# Patient Record
Sex: FEMALE | Race: WHITE
[De-identification: names, ages, dates, MRNs, and addresses within clinical notes are randomized per-mention and may not be internally consistent; named-entity substitution may affect disease eponyms.]

---

## 2018-08-04 ENCOUNTER — HOSPITAL ENCOUNTER (OUTPATIENT)
Dept: HOSPITAL 75 - LDRP | Age: 33
Setting detail: OBSERVATION
LOS: 1 days | Discharge: HOME | End: 2018-08-05
Attending: OBSTETRICS & GYNECOLOGY | Admitting: OBSTETRICS & GYNECOLOGY
Payer: COMMERCIAL

## 2018-08-04 VITALS — SYSTOLIC BLOOD PRESSURE: 110 MMHG | DIASTOLIC BLOOD PRESSURE: 75 MMHG

## 2018-08-04 VITALS — SYSTOLIC BLOOD PRESSURE: 120 MMHG | DIASTOLIC BLOOD PRESSURE: 72 MMHG

## 2018-08-04 VITALS — SYSTOLIC BLOOD PRESSURE: 112 MMHG | DIASTOLIC BLOOD PRESSURE: 69 MMHG

## 2018-08-04 VITALS — HEIGHT: 64 IN | BODY MASS INDEX: 27.57 KG/M2 | WEIGHT: 161.5 LBS

## 2018-08-04 VITALS — DIASTOLIC BLOOD PRESSURE: 78 MMHG | SYSTOLIC BLOOD PRESSURE: 123 MMHG

## 2018-08-04 VITALS — SYSTOLIC BLOOD PRESSURE: 113 MMHG | DIASTOLIC BLOOD PRESSURE: 74 MMHG

## 2018-08-04 VITALS — SYSTOLIC BLOOD PRESSURE: 108 MMHG | DIASTOLIC BLOOD PRESSURE: 69 MMHG

## 2018-08-04 VITALS — DIASTOLIC BLOOD PRESSURE: 72 MMHG | SYSTOLIC BLOOD PRESSURE: 112 MMHG

## 2018-08-04 VITALS — DIASTOLIC BLOOD PRESSURE: 74 MMHG | SYSTOLIC BLOOD PRESSURE: 108 MMHG

## 2018-08-04 VITALS — DIASTOLIC BLOOD PRESSURE: 81 MMHG | SYSTOLIC BLOOD PRESSURE: 116 MMHG

## 2018-08-04 VITALS — DIASTOLIC BLOOD PRESSURE: 81 MMHG | SYSTOLIC BLOOD PRESSURE: 122 MMHG

## 2018-08-04 DIAGNOSIS — O60.03: Primary | ICD-10-CM

## 2018-08-04 DIAGNOSIS — S80.01XA: ICD-10-CM

## 2018-08-04 DIAGNOSIS — O9A.213: ICD-10-CM

## 2018-08-04 DIAGNOSIS — Z3A.32: ICD-10-CM
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
## 2018-08-04 DIAGNOSIS — Z23: ICD-10-CM

## 2018-08-04 DIAGNOSIS — S50.02XA: ICD-10-CM

## 2018-08-04 LAB
BASOPHILS # BLD AUTO: 0 10^3/UL (ref 0–0.1)
BASOPHILS NFR BLD AUTO: 0 % (ref 0–10)
EOSINOPHIL # BLD AUTO: 0.1 10^3/UL (ref 0–0.3)
EOSINOPHIL NFR BLD AUTO: 1 % (ref 0–10)
ERYTHROCYTE [DISTWIDTH] IN BLOOD BY AUTOMATED COUNT: 13.1 % (ref 10–14.5)
HCT VFR BLD CALC: 39 % (ref 35–52)
HGB BLD-MCNC: 13.5 G/DL (ref 11.5–16)
LYMPHOCYTES # BLD AUTO: 1.8 X 10^3 (ref 1–4)
LYMPHOCYTES NFR BLD AUTO: 22 % (ref 12–44)
MANUAL DIFFERENTIAL PERFORMED BLD QL: NO
MCH RBC QN AUTO: 31 PG (ref 25–34)
MCHC RBC AUTO-ENTMCNC: 35 G/DL (ref 32–36)
MCV RBC AUTO: 88 FL (ref 80–99)
MONOCYTES # BLD AUTO: 0.6 X 10^3 (ref 0–1)
MONOCYTES NFR BLD AUTO: 7 % (ref 0–12)
NEUTROPHILS # BLD AUTO: 6 X 10^3 (ref 1.8–7.8)
NEUTROPHILS NFR BLD AUTO: 71 % (ref 42–75)
PLATELET # BLD: 263 10^3/UL (ref 130–400)
PMV BLD AUTO: 9.3 FL (ref 7.4–10.4)
RBC # BLD AUTO: 4.41 10^6/UL (ref 4.35–5.85)
WBC # BLD AUTO: 8.5 10^3/UL (ref 4.3–11)

## 2018-08-04 PROCEDURE — 36415 COLL VENOUS BLD VENIPUNCTURE: CPT

## 2018-08-04 PROCEDURE — 96361 HYDRATE IV INFUSION ADD-ON: CPT

## 2018-08-04 PROCEDURE — 85025 COMPLETE CBC W/AUTO DIFF WBC: CPT

## 2018-08-04 PROCEDURE — 99211 OFF/OP EST MAY X REQ PHY/QHP: CPT

## 2018-08-04 PROCEDURE — 96360 HYDRATION IV INFUSION INIT: CPT

## 2018-08-04 PROCEDURE — 96372 THER/PROPH/DIAG INJ SC/IM: CPT

## 2018-08-04 PROCEDURE — 90715 TDAP VACCINE 7 YRS/> IM: CPT

## 2018-08-04 RX ADMIN — BETAMETHASONE ACETATE AND BETAMETHASONE SODIUM PHOSPHATE SCH MG: 3; 3 INJECTION, SUSPENSION INTRA-ARTICULAR; INTRALESIONAL; INTRAMUSCULAR; SOFT TISSUE at 16:57

## 2018-08-04 RX ADMIN — SODIUM CHLORIDE, SODIUM LACTATE, POTASSIUM CHLORIDE, CALCIUM CHLORIDE, AND DEXTROSE MONOHYDRATE SCH MLS/HR: 600; 310; 30; 20; 5 INJECTION, SOLUTION INTRAVENOUS at 16:57

## 2018-08-04 NOTE — XMS REPORT
Continuity of Care Document

 Created on: 2018



DOLORES DUNAWAY

External Reference #: [id number]

: 1985

Sex: Female



Demographics







 Address  [address] [place] [address]

 

 Home Phone  [phone] x

 

 Preferred Language  Unknown

 

 Marital Status  Unknown

 

 Yazidi Affiliation  Unknown

 

 Race  Unknown

 

 Ethnic Group  Unknown





Author







 Author  [facility]

 

 Organization  [facility]

 

 Address  Unknown

 

 Phone  Unavailable



              



Allergies

      



There is no data.                  



Medications

      



There is no data.                  



Problems

      





 Date Dx Coded            Attending            Type            Code            
Diagnosis            Diagnosed By        

 

 2015            YOU RICHMOND DO            Ot            G97.1  
                                

 

 2015            YOU RICHMOND DO            Ot            O89.8  
                                



                    



Procedures

      



There is no data.                  



Results

      



There is no data.              



Encounters

      





 ACCT No.            Visit Date/Time            Discharge            Status    
        Pt. Type            Provider            Facility            Loc./Unit  
          Complaint        

 

 [id number]            2015 15:54:00            2015 16:40:00    
        DIS            Outpatient            YOU RICHMOND DO            
[facility]

## 2018-08-04 NOTE — HISTORY & PHYSICAL
History and Physical


Date Seen by Provider:  Aug 4, 2018


Time Seen by Provider:  22:01


This patient is a 33-year-old  A1 white female with an EDC of  giving her an estimated gestational age now of 32 weeks.  She presented 

approximately 1520 today after having experienced a fairly significant fall 

while playing with one of her children 3 p.m.  She has a fairly notable 

contusion to her left elbow and to her knee.  She denies rupture membranes or 

bleeding.  She is feeling occasional contractions.  During the initial period 

of monitoring she began to contract about every 2-4 minutes.  She does feel 

baby moving.  She has had no other complications this pregnancy.





Allergies are none





Medications are Synthroid and prenatal vitamins





Medical social and surgical histories are per the antepartum record





HEENT exam is normal


Neck supple no lymphadenopathy no thyromegaly


Abdomen is gravid soft nontender nondistended


Extremities show clubbing cyanosis.  The left elbow and forearm have a 10 x 14 

cm ecchymotic contusion.  The knee has a smaller abrasion.





Pelvic exam shows a cervix less than a centimeter dilated and thick





Fetal monitor shows contractions 2-4 minutes.  The patient feels about 6 or 8 

of these an hour.





Laboratory Tests


18 17:00








Assessment and plan   32 weeks gestation status post traumatic fall and now 

with  contractions.  Patient will be monitored for at least 24 hours 

because of the increased potential and risk for placental abruption after 

trauma.  She is being observed for signs or symptoms of progress in labor.  She 

does strain cervical change we will initiate tocolytics.  She has been given 12 

mg of betamethasone IM empirically and we will repeat that in 24 hours.


 labor/trauma in pregnancy





Allergies and Home Medications


Allergies


Coded Allergies:  


     No Known Drug Allergies (Unverified , 18)





Patient Home Medication List


Home Medication List Reviewed:  Yes











KENDRA TELLEZ MD Aug 4, 2018 10:06 pm

## 2018-08-05 VITALS — DIASTOLIC BLOOD PRESSURE: 72 MMHG | SYSTOLIC BLOOD PRESSURE: 113 MMHG

## 2018-08-05 VITALS — DIASTOLIC BLOOD PRESSURE: 71 MMHG | SYSTOLIC BLOOD PRESSURE: 111 MMHG

## 2018-08-05 VITALS — SYSTOLIC BLOOD PRESSURE: 95 MMHG | DIASTOLIC BLOOD PRESSURE: 53 MMHG

## 2018-08-05 VITALS — SYSTOLIC BLOOD PRESSURE: 116 MMHG | DIASTOLIC BLOOD PRESSURE: 73 MMHG

## 2018-08-05 VITALS — DIASTOLIC BLOOD PRESSURE: 62 MMHG | SYSTOLIC BLOOD PRESSURE: 114 MMHG

## 2018-08-05 RX ADMIN — SODIUM CHLORIDE, SODIUM LACTATE, POTASSIUM CHLORIDE, CALCIUM CHLORIDE, AND DEXTROSE MONOHYDRATE SCH MLS/HR: 600; 310; 30; 20; 5 INJECTION, SOLUTION INTRAVENOUS at 12:22

## 2018-08-05 RX ADMIN — BETAMETHASONE ACETATE AND BETAMETHASONE SODIUM PHOSPHATE SCH MG: 3; 3 INJECTION, SUSPENSION INTRA-ARTICULAR; INTRALESIONAL; INTRAMUSCULAR; SOFT TISSUE at 15:17

## 2018-08-05 RX ADMIN — SODIUM CHLORIDE, SODIUM LACTATE, POTASSIUM CHLORIDE, CALCIUM CHLORIDE, AND DEXTROSE MONOHYDRATE SCH MLS/HR: 600; 310; 30; 20; 5 INJECTION, SOLUTION INTRAVENOUS at 06:04

## 2018-08-05 RX ADMIN — SODIUM CHLORIDE, SODIUM LACTATE, POTASSIUM CHLORIDE, CALCIUM CHLORIDE, AND DEXTROSE MONOHYDRATE SCH MLS/HR: 600; 310; 30; 20; 5 INJECTION, SOLUTION INTRAVENOUS at 00:00

## 2018-08-05 NOTE — DISCHARGE INSTRUCTIONS
Discharge Instructions


Discharge Medications


New, Converted or Re-Newed RX:  Other





Patient Instructions


Patient Instructions:  


As directed


Return to The Hospital For:  


As directed





Activity & Diet


Discharge Diet:  No Restrictions


Activity as Tolerated:  Yes





Orders-Post D/C & Referrals


Return to clinic as scheduled for your are OB appointment


Return to clinic for any signs symptoms or indications of  labor


Return to clinic for any issues of concern with the pregnancy.











KENDRA TELLEZ MD Aug 5, 2018 7:22 am

## 2018-08-05 NOTE — PROGRESS NOTE-STANDARD
Standard Progress Note


Progress Notes/Assess & Plan


Date Seen by Provider:  Aug 5, 2018


Time Seen by Provider:  07:17


Progress/Assessment & Plan


This patient is without complaint except for her left elbow and right knee 

being tender.  She denies contractions.  She does feel baby moving.  She denies 

rupture membranes or bleeding.  She denies headache, denies shortness of breath

, denies nausea vomiting, and denies chest pain.





Fetal monitor shows no contractions.  There is a normal fetal heart rate 

pattern.








Vital Signs








  Date Time  Temp Pulse Resp B/P (MAP) Pulse Ox O2 Delivery O2 Flow Rate FiO2


 


18 05:30 97.6 83 18 113/72 (86)  Room Air  


 


18 03:30 97.8 81 18 111/71 (84)  Room Air  


 


18 00:30 98.1 76 18 114/62 (79)  Room Air  


 


18 23:30  78 18 112/69 (83)  Room Air  


 


18 22:00  94 18 110/75 (87)  Room Air  


 


18 21:00  90  108/69 (82)  Room Air  


 


18 20:00 99.0 94  120/72 (88)  Room Air  


 


18 19:00  88  112/72 (85)  Room Air  


 


18 18:30  86  113/74 (87)  Room Air  


 


18 18:00  86  108/74 (85)  Room Air  


 


18 17:30 98.1 82  116/81 (93)  Room Air  


 


18 17:00      Room Air  


 


18 16:30  73 16 122/81 (95)  Room Air  


 


18 16:00  87 16 123/78 (93)  Room Air  





Vital signs are stable.  Patient afebrile.





The abdomen is gravid soft nontender nondistended.


Extremities show no clubbing cyanosis.  There is a contusion on her left elbow 

an abrasion and contusion on her right knee that are stable.  There is no 

evidence of bone fracture.  Patient can move her extremities well.  She states 

that she is sore but her pain has improved since admission.





Assessment and plan   hospital day number 2 with  labor that now has 

resolved.  This returned labor commenced after the patient had experienced a 

traumatic fall.  She has been observed since shortly after 3 p.m. yesterday 

when she follows observation will continue until 3 p.m. today specific for any 

signs symptoms and indications of fetal compromise/abruption.  He remains 

stable through the 24-hour period of observation and she will be discharged 

home with follow-up in clinic


Final Diagnosis


 labor/trauma in pregnancy











KENDRA TELLEZ MD Aug 5, 2018 7:19 am

## 2018-09-07 ENCOUNTER — HOSPITAL ENCOUNTER (OUTPATIENT)
Dept: HOSPITAL 75 - PREOP | Age: 33
Discharge: HOME | End: 2018-09-07
Attending: OBSTETRICS & GYNECOLOGY
Payer: COMMERCIAL

## 2018-09-07 VITALS — WEIGHT: 163.5 LBS | BODY MASS INDEX: 27.91 KG/M2 | HEIGHT: 64 IN

## 2018-09-07 VITALS — DIASTOLIC BLOOD PRESSURE: 81 MMHG | SYSTOLIC BLOOD PRESSURE: 115 MMHG

## 2018-09-07 DIAGNOSIS — Z01.818: Primary | ICD-10-CM

## 2018-09-07 PROCEDURE — 87081 CULTURE SCREEN ONLY: CPT

## 2018-09-14 ENCOUNTER — HOSPITAL ENCOUNTER (INPATIENT)
Dept: HOSPITAL 75 - LDRP | Age: 33
LOS: 2 days | Discharge: HOME | End: 2018-09-16
Attending: OBSTETRICS & GYNECOLOGY | Admitting: OBSTETRICS & GYNECOLOGY
Payer: COMMERCIAL

## 2018-09-14 VITALS — SYSTOLIC BLOOD PRESSURE: 124 MMHG | DIASTOLIC BLOOD PRESSURE: 83 MMHG

## 2018-09-14 VITALS — HEIGHT: 64 IN | BODY MASS INDEX: 28 KG/M2 | WEIGHT: 164 LBS

## 2018-09-14 VITALS — SYSTOLIC BLOOD PRESSURE: 135 MMHG | DIASTOLIC BLOOD PRESSURE: 93 MMHG

## 2018-09-14 VITALS — SYSTOLIC BLOOD PRESSURE: 111 MMHG | DIASTOLIC BLOOD PRESSURE: 68 MMHG

## 2018-09-14 VITALS — DIASTOLIC BLOOD PRESSURE: 84 MMHG | SYSTOLIC BLOOD PRESSURE: 127 MMHG

## 2018-09-14 VITALS — DIASTOLIC BLOOD PRESSURE: 90 MMHG | SYSTOLIC BLOOD PRESSURE: 120 MMHG

## 2018-09-14 DIAGNOSIS — O34.211: ICD-10-CM

## 2018-09-14 DIAGNOSIS — O40.3XX0: Primary | ICD-10-CM

## 2018-09-14 DIAGNOSIS — E03.9: ICD-10-CM

## 2018-09-14 DIAGNOSIS — Z3A.38: ICD-10-CM

## 2018-09-14 LAB
APTT PPP: YELLOW S
BACTERIA #/AREA URNS HPF: (no result) /HPF
BASOPHILS # BLD AUTO: 0 10^3/UL (ref 0–0.1)
BASOPHILS NFR BLD AUTO: 0 % (ref 0–10)
BILIRUB UR QL STRIP: NEGATIVE
EOSINOPHIL # BLD AUTO: 0 10^3/UL (ref 0–0.3)
EOSINOPHIL NFR BLD AUTO: 0 % (ref 0–10)
ERYTHROCYTE [DISTWIDTH] IN BLOOD BY AUTOMATED COUNT: 12.7 % (ref 10–14.5)
FIBRINOGEN PPP-MCNC: (no result) MG/DL
GLUCOSE UR STRIP-MCNC: NEGATIVE MG/DL
HCT VFR BLD CALC: 36 % (ref 35–52)
HGB BLD-MCNC: 12.9 G/DL (ref 11.5–16)
KETONES UR QL STRIP: NEGATIVE
LEUKOCYTE ESTERASE UR QL STRIP: NEGATIVE
LYMPHOCYTES # BLD AUTO: 1.5 X 10^3 (ref 1–4)
LYMPHOCYTES NFR BLD AUTO: 22 % (ref 12–44)
MANUAL DIFFERENTIAL PERFORMED BLD QL: NO
MCH RBC QN AUTO: 31 PG (ref 25–34)
MCHC RBC AUTO-ENTMCNC: 36 G/DL (ref 32–36)
MCV RBC AUTO: 86 FL (ref 80–99)
MONOCYTES # BLD AUTO: 0.5 X 10^3 (ref 0–1)
MONOCYTES NFR BLD AUTO: 7 % (ref 0–12)
NEUTROPHILS # BLD AUTO: 5 X 10^3 (ref 1.8–7.8)
NEUTROPHILS NFR BLD AUTO: 71 % (ref 42–75)
NITRITE UR QL STRIP: NEGATIVE
PH UR STRIP: 6 [PH] (ref 5–9)
PLATELET # BLD: 224 10^3/UL (ref 130–400)
PMV BLD AUTO: 10 FL (ref 7.4–10.4)
PROT UR QL STRIP: NEGATIVE
RBC # BLD AUTO: 4.23 10^6/UL (ref 4.35–5.85)
RBC #/AREA URNS HPF: (no result) /HPF
SP GR UR STRIP: 1.02 (ref 1.02–1.02)
SQUAMOUS #/AREA URNS HPF: (no result) /HPF
UROBILINOGEN UR-MCNC: NORMAL MG/DL
WBC # BLD AUTO: 7 10^3/UL (ref 4.3–11)
WBC #/AREA URNS HPF: (no result) /HPF

## 2018-09-14 PROCEDURE — 81000 URINALYSIS NONAUTO W/SCOPE: CPT

## 2018-09-14 PROCEDURE — 85025 COMPLETE CBC W/AUTO DIFF WBC: CPT

## 2018-09-14 PROCEDURE — 36415 COLL VENOUS BLD VENIPUNCTURE: CPT

## 2018-09-14 PROCEDURE — 94664 DEMO&/EVAL PT USE INHALER: CPT

## 2018-09-14 PROCEDURE — 86901 BLOOD TYPING SEROLOGIC RH(D): CPT

## 2018-09-14 PROCEDURE — 86900 BLOOD TYPING SEROLOGIC ABO: CPT

## 2018-09-14 PROCEDURE — 86850 RBC ANTIBODY SCREEN: CPT

## 2018-09-14 PROCEDURE — 88307 TISSUE EXAM BY PATHOLOGIST: CPT

## 2018-09-14 RX ADMIN — OXYCODONE HYDROCHLORIDE AND ACETAMINOPHEN PRN TAB: 10; 325 TABLET ORAL at 23:56

## 2018-09-14 RX ADMIN — SODIUM CHLORIDE, SODIUM LACTATE, POTASSIUM CHLORIDE, AND CALCIUM CHLORIDE PRN MLS/HR: 600; 310; 30; 20 INJECTION, SOLUTION INTRAVENOUS at 11:00

## 2018-09-14 RX ADMIN — OXYCODONE HYDROCHLORIDE AND ACETAMINOPHEN PRN TAB: 10; 325 TABLET ORAL at 16:00

## 2018-09-14 RX ADMIN — OXYCODONE HYDROCHLORIDE AND ACETAMINOPHEN PRN TAB: 10; 325 TABLET ORAL at 18:30

## 2018-09-14 RX ADMIN — KETOROLAC TROMETHAMINE SCH MG: 30 INJECTION, SOLUTION INTRAMUSCULAR; INTRAVENOUS at 19:45

## 2018-09-14 RX ADMIN — KETOROLAC TROMETHAMINE SCH MG: 30 INJECTION, SOLUTION INTRAMUSCULAR; INTRAVENOUS at 13:40

## 2018-09-14 RX ADMIN — DOCUSATE SODIUM SCH MG: 100 CAPSULE ORAL at 21:19

## 2018-09-14 RX ADMIN — OXYCODONE HYDROCHLORIDE AND ACETAMINOPHEN PRN TAB: 10; 325 TABLET ORAL at 22:41

## 2018-09-14 RX ADMIN — SODIUM CHLORIDE, SODIUM LACTATE, POTASSIUM CHLORIDE, AND CALCIUM CHLORIDE PRN MLS/HR: 600; 310; 30; 20 INJECTION, SOLUTION INTRAVENOUS at 10:34

## 2018-09-14 NOTE — XMS REPORT
Continuity of Care Document

 Created on: 2018



DOLORES DUNAWAY

External Reference #: [id number]

: 1985

Sex: Female



Demographics







 Address  [address] [place]  [address]

 

 Home Phone  [phone] x

 

 Preferred Language  Unknown

 

 Marital Status  Unknown

 

 Pentecostalism Affiliation  Unknown

 

 Race  Unknown

 

 Ethnic Group  Unknown





Author







 Author  [facility]

 

 Organization  [facility]

 

 Address  Unknown

 

 Phone  Unavailable



              



Allergies

      





 Active            Description            Code            Type            
Severity            Reaction            Onset            Reported/Identified   
         Relationship to Patient            Clinical Status        

 

 Yes            No Known Drug Allergies            [id number]            Drug 
Allergy            Unknown            N/A                         2018   
                               



                  



Medications

      



There is no data.                  



Problems

      





 Date Dx Coded            Attending            Type            Code            
Diagnosis            Diagnosed By        

 

 2015            YOU RICHMOND DO            Ot            G97.1  
                                

 

 2015            YOU RICHMOND DO            Ot            O89.8  
                                

 

 2018                         Ot            O60.03             
LABOR WITHOUT DELIVERY, THIRD TR                     

 

 2018                         Ot            O9A.213            INJ/POISN/
OTH CONSEQ OF EXTERNAL CAUSES                      

 

 2018                         Ot            S50.02XA            CONTUSION 
OF LEFT ELBOW, INITIAL ENCOUNT                     

 

 2018                         Ot            S80.01XA            CONTUSION 
OF RIGHT KNEE, INITIAL ENCOUNT                     

 

 2018                         Ot            W19.XXXA            
UNSPECIFIED FALL, INITIAL ENCOUNTER                     

 

 2018                         Ot            Z23            ENCOUNTER FOR 
IMMUNIZATION                     

 

 2018                         Ot            Z3A.32            32 WEEKS 
GESTATION OF PREGNANCY                     



                                  



Procedures

      



There is no data.                  



Results

      





 Test            Result            Range        









 Complete blood count (CBC) with automated white blood cell (WBC) differential 
- 18 17:00         









 Blood leukocytes automated count (number/volume)            8.5 10*3/uL       
     4.3-11.0        

 

 Blood erythrocytes automated count (number/volume)            4.41 10*6/uL    
        4.35-5.85        

 

 Venous blood hemoglobin measurement (mass/volume)            13.5 g/dL        
    11.5-16.0        

 

 Blood hematocrit (volume fraction)            39 %            35-52        

 

 Automated erythrocyte mean corpuscular volume            88 [foz_us]          
  80-99        

 

 Automated erythrocyte mean corpuscular hemoglobin (mass per erythrocyte)      
      31 pg            25-34        

 

 Automated erythrocyte mean corpuscular hemoglobin concentration measurement (
mass/volume)            35 g/dL            32-36        

 

 Automated erythrocyte distribution width ratio            13.1 %            
10.0-14.5        

 

 Automated blood platelet count (count/volume)            263 10*3/uL          
  130-400        

 

 Automated blood platelet mean volume measurement            9.3 [foz_us]      
      7.4-10.4        

 

 Automated blood neutrophils/100 leukocytes            71 %            42-75   
     

 

 Automated blood lymphocytes/100 leukocytes            22 %            12-44   
     

 

 Blood monocytes/100 leukocytes            7 %            0-12        

 

 Automated blood eosinophils/100 leukocytes            1 %            0-10     
   

 

 Automated blood basophils/100 leukocytes            0 %            0-10        

 

 Blood neutrophils automated count (number/volume)            6.0 10*3         
   1.8-7.8        

 

 Blood lymphocytes automated count (number/volume)            1.8 10*3         
   1.0-4.0        

 

 Blood monocytes automated count (number/volume)            0.6 10*3            
0.0-1.0        

 

 Automated eosinophil count            0.1 10*3/uL            0.0-0.3        

 

 Automated blood basophil count (count/volume)            0.0 10*3/uL          
  0.0-0.1        









 [id number] - 18 17:00         









 [id number]            SPECIMEN AVAILABLE             NRG        









 Methicillin resistant Staphylococcus aureus (MRSA) screening culture -  09:33         









 Methicillin resistant Staphylococcus aureus (MRSA) screening culture          
  NEG             NRG        



                    



Encounters

      





 ACCT No.            Visit Date/Time            Discharge            Status    
        Pt. Type            Provider            Facility            Loc./Unit  
          Complaint        

 

 [id number]            2015 15:54:00            2015 16:40:00    
        DIS            Outpatient            YOU RICHMOND DO            
[facility]                     

 

 [id number]            2018 10:12:00                         ACT       
     Inpatient            KENDRA TELLEZ MD            [facility]            LD            PREVIOUS        

 

 [id number]            2018 14:28:00                                   
   Document Registration                                                       
     

 

 [id number]            2018 07:21:00                                   
   Document Registration

## 2018-09-14 NOTE — HISTORY & PHYSICAL
History and Physical


Date Seen by Provider:  Sep 14, 2018


Time Seen by Provider:  12:04


This patient is a 33-year-old  A1  1 white female with an EDC of 

2018 presenting now for repeat  delivery at 38 weeks 

gestation.  She has a history significant for polyhydramnios.  She is also 

hypothyroid on medication.  She denies rupture membranes or bleeding she is 

having contractions about every 4-5 minutes.  She had a GBS culture at 35 weeks 

gestation that was negative.





Allergies are none





Medications are Synthroid 200 g daily and prenatal vitamins





Medical social and surgical histories are per the antepartum record





HEENT exam is normal


Neck supple no lymphadenopathy no thyromegaly


Abdomen is gravid soft nontender nondistended


Extreme show no clubbing cyanosis.  There is no Homans sign.








Pelvic exam is deferred





Laboratory Tests


18 10:34








Fetal monitor shows contractions every 4-5 minutes and a normal fetal heart 

rate pattern





Assessment and plan   term pregnancy at 38 weeks gestation admitted now for 

repeat  delivery.  Surgical risks and complications recovery and follow-

up have been fully discussed.  Patient accepts those risks and is ready to


38 weeks gestation Previous  he has





Allergies and Home Medications


Allergies


Coded Allergies:  


     No Known Drug Allergies (Unverified , 18)





Home Medications


Levothyroxine Sodium 200 Mcg Tablet, 200 MCG PO DAILY, (Reported)


Prenatal Vit/Iron Fumarate/FA 1 Each Tablet, 1 EACH PO DAILY, (Reported)





Patient Home Medication List


Home Medication List Reviewed:  Yes











KENDRA TELLEZ MD Sep 14, 2018 12:07 pm

## 2018-09-14 NOTE — OPERATIVE REPORT
DATE OF SERVICE:  2018



PREOPERATIVE DIAGNOSIS:

Term pregnancy at 38 weeks gestation with polyhydramnios for repeat .



POSTOPERATIVE DIAGNOSIS:

Term pregnancy at 38 weeks gestation with polyhydramnios for repeat .



OPERATIVE PROCEDURE:

Repeat low transverse  delivery of a viable female infant with Apgars of

8 and 9 at 1 and 5 minutes.  Expected weight of 7 pounds 1 ounce, birth time of

12:24 and a cord blood pH was 7.31.



OPERATIVE DESCRIPTION:

With the patient in the supine position under satisfactory spinal analgesia, the

patient was prepped and draped in the usual fashion for abdominal surgery. 

Clark catheter was placed in the urinary bladder.



A repeat Pfannenstiel incision made through skin with a scalpel.  The patient's

abdomen entered in the usual manner.  Bladder retractor placed in position,

clean scalpel used to make a 4 cm hysterotomy incision transversely across the

lower uterine segment that was extended by blunt dissection as well.  Copious

clear fluid was released on hysterotomy.  Ruiz forceps were applied to

facilitate the delivery of a vigorous viable female infant with Apgars and stats

were noted above.



The infant was bulb suctioned on delivery of the head and nuchal cord was easily

released and delivery completed.  The infant was bulb suctioned on delivery of

the head and then again on completion of delivery.  The umbilical cord was

doubly clamped and cut and the infant passed to the pediatric nurse in

attendance for delivery.



Cord bloods were obtained.  The placenta delivered spontaneously and partially

manually as it was quite adherent near the fundus.  The placenta was sent to

pathology for permanent section.  It was a normal placenta with a 3-vessel cord.

 The uterus was exteriorized and to wipe clean with a wet laparotomy sponge. 

Uterine incision closed with running locked suture of 2-0 Vicryl.  Hemostasis

was complete.  The uterus was returned to the abdominal cavity.  All blood clot

and debris removed from the abdominal cavity.  Sponge and needle counts correct,

hemostasis assured.  The anterior parietal peritoneum was closed in a running

suture of 2-0 Vicryl.  Rectus muscles were closed with that suture.  Rectus

fascia was closed with 2-0 Vicryl, subcutaneous tissue with 2-0 Vicryl and the

skin was stapled.



Sponge and needle counts were correct at the end of procedure.  Estimated blood

loss for procedure was around 500 mL.  The patient tolerated the procedure well

and was transferred to the recovery room in stable condition.  The infant had

been taken stable to the full term nursery under the care of the pediatric

nurse.





Job ID: [id number]

DocumentID: [id number]

Dictated Date:  2018 12:49:54

Transcription Date: 2018 18:13:39

Dictated By: KENDRA TELLEZ MD

## 2018-09-15 VITALS — SYSTOLIC BLOOD PRESSURE: 123 MMHG | DIASTOLIC BLOOD PRESSURE: 83 MMHG

## 2018-09-15 VITALS — DIASTOLIC BLOOD PRESSURE: 80 MMHG | SYSTOLIC BLOOD PRESSURE: 106 MMHG

## 2018-09-15 VITALS — SYSTOLIC BLOOD PRESSURE: 122 MMHG | DIASTOLIC BLOOD PRESSURE: 83 MMHG

## 2018-09-15 VITALS — SYSTOLIC BLOOD PRESSURE: 116 MMHG | DIASTOLIC BLOOD PRESSURE: 80 MMHG

## 2018-09-15 VITALS — SYSTOLIC BLOOD PRESSURE: 124 MMHG | DIASTOLIC BLOOD PRESSURE: 89 MMHG

## 2018-09-15 VITALS — DIASTOLIC BLOOD PRESSURE: 72 MMHG | SYSTOLIC BLOOD PRESSURE: 117 MMHG

## 2018-09-15 RX ADMIN — OXYCODONE HYDROCHLORIDE AND ACETAMINOPHEN PRN TAB: 10; 325 TABLET ORAL at 08:25

## 2018-09-15 RX ADMIN — OXYCODONE HYDROCHLORIDE AND ACETAMINOPHEN PRN TAB: 10; 325 TABLET ORAL at 12:32

## 2018-09-15 RX ADMIN — OXYCODONE HYDROCHLORIDE AND ACETAMINOPHEN PRN TAB: 10; 325 TABLET ORAL at 17:09

## 2018-09-15 RX ADMIN — IBUPROFEN SCH MG: 800 TABLET, FILM COATED ORAL at 20:31

## 2018-09-15 RX ADMIN — DOCUSATE SODIUM SCH MG: 100 CAPSULE ORAL at 08:25

## 2018-09-15 RX ADMIN — OXYCODONE HYDROCHLORIDE AND ACETAMINOPHEN PRN TAB: 10; 325 TABLET ORAL at 04:22

## 2018-09-15 RX ADMIN — KETOROLAC TROMETHAMINE SCH MG: 30 INJECTION, SOLUTION INTRAMUSCULAR; INTRAVENOUS at 02:08

## 2018-09-15 RX ADMIN — DOCUSATE SODIUM SCH MG: 100 CAPSULE ORAL at 20:31

## 2018-09-15 RX ADMIN — OXYCODONE HYDROCHLORIDE AND ACETAMINOPHEN PRN TAB: 10; 325 TABLET ORAL at 20:30

## 2018-09-15 RX ADMIN — IBUPROFEN SCH MG: 800 TABLET, FILM COATED ORAL at 15:16

## 2018-09-15 NOTE — PROGRESS NOTE-STANDARD
Standard Progress Note


Progress Notes/Assess & Plan


Date Seen by Provider:  Sep 15, 2018


Time Seen by Provider:  07:43


Progress/Assessment & Plan


This patient is without complaint.  She is ambulating, voiding, tolerating oral 

intake well has good pain control.








Vital Signs








 9/14/18 9/15/18





 16:00 04:20


 


Temp  97.8


 


Pulse  86


 


Resp  18


 


B/P (MAP)  116/80 (92)


 


Pulse Ox  98


 


O2 Delivery Room Air 





Vital signs are stable.  Patient is afebrile.





Fundus is firm below the umbilicus and nontender.  The surgical incision is 

clean dry and intact.


Extremities show no clubbing or cyanosis.  There is no Homans sign.





Assessment and plan   postoperative day number 1 status post repeat  

doing well.  Plan is for routine convalescence care











KENDRA TELLEZ MD Sep 15, 2018 7:44 am

## 2018-09-15 NOTE — ANESTHESIA-REGIONAL POST-OP
Regional


Patient Condition


Mental Status:  Alert, Oriented x3


Circulation:  Same as Pre-Op


Headache:  Absent


Sensation:  Full Recovery


Motor Block:  Absent





Post Op Complications


Complications


None





Follow Up Care/Instructions


Patient Instructions


None needed.





Anesthesia/Patient Condition


Patient is doing well, no complaints, stable vital signs, no apparent adverse 

anesthesia problems.   


No complications reported per nursing.


D/C home per [facility] Criteria:  ALBERTINA Buckley CRNA Sep 15, 2018 09:07

## 2018-09-15 NOTE — DISCHARGE INSTRUCTIONS
Discharge Instructions


Discharge Medications


New, Converted or Re-Newed RX:  RX on Chart





Patient Instructions


Patient Instructions:  


As directed


Return to The Hospital For:  


As directed





Activity & Diet


Discharge Diet:  No Restrictions


Activity as Tolerated:  No





Orders-Post D/C & Referrals





Follow Up Appt:


RTC on Friday, 2018 at 930 a.m. for incision check.


Call to make follow up appt. for patient in 4 weeks.





Wound Care:


Remove staples, apply benzoin and steri strips.





Activity 


Per routine post  instructions.





Please call in RX to patient pharmacy.





Diet as tolerated





Patient may shower or tub bathe as desired.





Continue home meds











KENDRA TELLEZ MD Sep 15, 2018 7:47 am

## 2018-09-16 VITALS — SYSTOLIC BLOOD PRESSURE: 125 MMHG | DIASTOLIC BLOOD PRESSURE: 79 MMHG

## 2018-09-16 VITALS — DIASTOLIC BLOOD PRESSURE: 69 MMHG | SYSTOLIC BLOOD PRESSURE: 112 MMHG

## 2018-09-16 VITALS — SYSTOLIC BLOOD PRESSURE: 106 MMHG | DIASTOLIC BLOOD PRESSURE: 72 MMHG

## 2018-09-16 RX ADMIN — IBUPROFEN SCH MG: 800 TABLET, FILM COATED ORAL at 08:45

## 2018-09-16 RX ADMIN — IBUPROFEN SCH MG: 800 TABLET, FILM COATED ORAL at 14:26

## 2018-09-16 RX ADMIN — OXYCODONE HYDROCHLORIDE AND ACETAMINOPHEN PRN TAB: 10; 325 TABLET ORAL at 00:24

## 2018-09-16 RX ADMIN — DOCUSATE SODIUM SCH MG: 100 CAPSULE ORAL at 08:45

## 2018-09-16 RX ADMIN — OXYCODONE HYDROCHLORIDE AND ACETAMINOPHEN PRN TAB: 10; 325 TABLET ORAL at 03:21

## 2018-09-16 RX ADMIN — IBUPROFEN SCH MG: 800 TABLET, FILM COATED ORAL at 03:20

## 2018-09-16 RX ADMIN — OXYCODONE HYDROCHLORIDE AND ACETAMINOPHEN PRN TAB: 10; 325 TABLET ORAL at 14:26

## 2018-09-16 NOTE — PROGRESS NOTE-STANDARD
Standard Progress Note


Progress Notes/Assess & Plan


Date Seen by Provider:  Sep 16, 2018


Time Seen by Provider:  11:25


Progress/Assessment & Plan


This patient is without complaint.  She is ambulating, voiding, tolerating oral 

intake well has good pain control.








Vital Signs








 9/14/18 9/15/18





 16:00 04:20


 


Temp  97.8


 


Pulse  86


 


Resp  18


 


B/P (MAP)  116/80 (92)


 


Pulse Ox  98


 


O2 Delivery Room Air 





Vital signs are stable.  Patient is afebrile.





Fundus is firm below the umbilicus and nontender.  The surgical incision is 

clean dry and intact.


Extremities show no clubbing or cyanosis.  There is no Homans sign.





Assessment and plan   postoperative day number 1 status post repeat  

doing well.  Plan is for routine convalescence care





2018


Patient is without complaint.  She is ambulating, voiding, tolerating oral 

intake well, has good pain control, and is requesting discharge home.








Vital Signs








 18





 08:30


 


Temp 97.9


 


Pulse 84


 


Resp 18


 


B/P (MAP) 106/72 (83)


 


Pulse Ox 97


 


O2 Delivery Room Air





Vital signs are stable.  Patient is afebrile.





Fundus is firm below the umbilicus and nontender.


Extremities show no clubbing cyanosis.  There is no Homans sign.





Assessment and plan   post operative day number 2 status post repeat  

delivery doing well.  Plan is for discharge home with follow-up in clinic


Final Diagnosis


38 week repeat  delivery











KENDRA TELLEZ MD Sep 16, 2018 11:26 am

## 2021-12-27 ENCOUNTER — HOSPITAL ENCOUNTER (OUTPATIENT)
Dept: HOSPITAL 75 - SDC | Age: 36
End: 2021-12-27
Attending: OBSTETRICS & GYNECOLOGY
Payer: COMMERCIAL

## 2021-12-27 VITALS — SYSTOLIC BLOOD PRESSURE: 125 MMHG | DIASTOLIC BLOOD PRESSURE: 86 MMHG

## 2021-12-27 VITALS — BODY MASS INDEX: 27.36 KG/M2 | HEIGHT: 64.02 IN | WEIGHT: 160.28 LBS

## 2021-12-27 DIAGNOSIS — D64.9: Primary | ICD-10-CM

## 2021-12-27 PROCEDURE — 96365 THER/PROPH/DIAG IV INF INIT: CPT

## 2022-02-17 ENCOUNTER — HOSPITAL ENCOUNTER (OUTPATIENT)
Dept: HOSPITAL 75 - LABNPT | Age: 37
End: 2022-02-17
Attending: OBSTETRICS & GYNECOLOGY
Payer: COMMERCIAL

## 2022-02-17 DIAGNOSIS — O16.3: Primary | ICD-10-CM

## 2022-02-17 LAB
CREAT UR-MCNC: 101 MG/DL (ref 30–125)
PROT UR-MCNC: 11 MG/DL (ref 6–12)

## 2022-02-17 PROCEDURE — 82570 ASSAY OF URINE CREATININE: CPT

## 2022-02-17 PROCEDURE — 84156 ASSAY OF PROTEIN URINE: CPT

## 2022-02-23 ENCOUNTER — HOSPITAL ENCOUNTER (OUTPATIENT)
Dept: HOSPITAL 75 - LABNPT | Age: 37
End: 2022-02-23
Attending: OBSTETRICS & GYNECOLOGY
Payer: COMMERCIAL

## 2022-02-23 DIAGNOSIS — O13.9: Primary | ICD-10-CM

## 2022-02-23 LAB
CREAT UR-MCNC: 144 MG/DL (ref 30–125)
PROT UR-MCNC: 16 MG/DL (ref 6–12)

## 2022-02-23 PROCEDURE — 82570 ASSAY OF URINE CREATININE: CPT

## 2022-02-23 PROCEDURE — 84156 ASSAY OF PROTEIN URINE: CPT

## 2022-03-09 ENCOUNTER — HOSPITAL ENCOUNTER (OUTPATIENT)
Dept: HOSPITAL 75 - PREOP | Age: 37
Discharge: HOME | End: 2022-03-09
Attending: OBSTETRICS & GYNECOLOGY
Payer: COMMERCIAL

## 2022-03-09 VITALS — WEIGHT: 171.3 LBS | HEIGHT: 64.02 IN | BODY MASS INDEX: 29.24 KG/M2

## 2022-03-09 DIAGNOSIS — Z01.818: Primary | ICD-10-CM

## 2022-03-10 ENCOUNTER — HOSPITAL ENCOUNTER (INPATIENT)
Dept: HOSPITAL 75 - LDRP | Age: 37
LOS: 2 days | Discharge: HOME | End: 2022-03-12
Attending: OBSTETRICS & GYNECOLOGY | Admitting: OBSTETRICS & GYNECOLOGY
Payer: COMMERCIAL

## 2022-03-10 VITALS — DIASTOLIC BLOOD PRESSURE: 85 MMHG | SYSTOLIC BLOOD PRESSURE: 136 MMHG

## 2022-03-10 VITALS — DIASTOLIC BLOOD PRESSURE: 91 MMHG | SYSTOLIC BLOOD PRESSURE: 149 MMHG

## 2022-03-10 VITALS — DIASTOLIC BLOOD PRESSURE: 103 MMHG | SYSTOLIC BLOOD PRESSURE: 126 MMHG

## 2022-03-10 VITALS — SYSTOLIC BLOOD PRESSURE: 108 MMHG | DIASTOLIC BLOOD PRESSURE: 64 MMHG

## 2022-03-10 VITALS — HEIGHT: 64.02 IN | WEIGHT: 173.5 LBS | BODY MASS INDEX: 29.62 KG/M2

## 2022-03-10 VITALS — SYSTOLIC BLOOD PRESSURE: 122 MMHG | DIASTOLIC BLOOD PRESSURE: 74 MMHG

## 2022-03-10 VITALS — SYSTOLIC BLOOD PRESSURE: 132 MMHG | DIASTOLIC BLOOD PRESSURE: 93 MMHG

## 2022-03-10 VITALS — SYSTOLIC BLOOD PRESSURE: 134 MMHG | DIASTOLIC BLOOD PRESSURE: 91 MMHG

## 2022-03-10 VITALS — DIASTOLIC BLOOD PRESSURE: 84 MMHG | SYSTOLIC BLOOD PRESSURE: 130 MMHG

## 2022-03-10 VITALS — SYSTOLIC BLOOD PRESSURE: 139 MMHG | DIASTOLIC BLOOD PRESSURE: 90 MMHG

## 2022-03-10 VITALS — DIASTOLIC BLOOD PRESSURE: 81 MMHG | SYSTOLIC BLOOD PRESSURE: 150 MMHG

## 2022-03-10 VITALS — SYSTOLIC BLOOD PRESSURE: 125 MMHG | DIASTOLIC BLOOD PRESSURE: 99 MMHG

## 2022-03-10 DIAGNOSIS — D62: ICD-10-CM

## 2022-03-10 DIAGNOSIS — O34.211: Primary | ICD-10-CM

## 2022-03-10 DIAGNOSIS — Q51.810: ICD-10-CM

## 2022-03-10 DIAGNOSIS — O35.8XX0: ICD-10-CM

## 2022-03-10 DIAGNOSIS — Z3A.36: ICD-10-CM

## 2022-03-10 LAB
APTT PPP: YELLOW S
BACTERIA #/AREA URNS HPF: (no result) /HPF
BASOPHILS # BLD AUTO: 0 10^3/UL (ref 0–0.1)
BASOPHILS NFR BLD AUTO: 0 % (ref 0–10)
BILIRUB UR QL STRIP: NEGATIVE
EOSINOPHIL # BLD AUTO: 0.1 10^3/UL (ref 0–0.3)
EOSINOPHIL NFR BLD AUTO: 1 % (ref 0–10)
FIBRINOGEN PPP-MCNC: CLEAR MG/DL
GLUCOSE UR STRIP-MCNC: NEGATIVE MG/DL
HCT VFR BLD CALC: 38 % (ref 35–52)
HGB BLD-MCNC: 12.6 G/DL (ref 11.5–16)
KETONES UR QL STRIP: NEGATIVE
LEUKOCYTE ESTERASE UR QL STRIP: NEGATIVE
LYMPHOCYTES # BLD AUTO: 1.6 10^3/UL (ref 1–4)
LYMPHOCYTES NFR BLD AUTO: 25 % (ref 12–44)
MANUAL DIFFERENTIAL PERFORMED BLD QL: NO
MCH RBC QN AUTO: 29 PG (ref 25–34)
MCHC RBC AUTO-ENTMCNC: 34 G/DL (ref 32–36)
MCV RBC AUTO: 86 FL (ref 80–99)
MONOCYTES # BLD AUTO: 0.5 10^3/UL (ref 0–1)
MONOCYTES NFR BLD AUTO: 7 % (ref 0–12)
NEUTROPHILS # BLD AUTO: 4.3 10^3/UL (ref 1.8–7.8)
NEUTROPHILS NFR BLD AUTO: 67 % (ref 42–75)
NITRITE UR QL STRIP: NEGATIVE
PH UR STRIP: 6 [PH] (ref 5–9)
PLATELET # BLD: 178 10^3/UL (ref 130–400)
PMV BLD AUTO: 10.1 FL (ref 9–12.2)
PROT UR QL STRIP: NEGATIVE
RBC #/AREA URNS HPF: (no result) /HPF
SP GR UR STRIP: >=1.03 (ref 1.02–1.02)
SQUAMOUS #/AREA URNS HPF: (no result) /HPF
WBC # BLD AUTO: 6.5 10^3/UL (ref 4.3–11)
WBC #/AREA URNS HPF: (no result) /HPF

## 2022-03-10 PROCEDURE — 85025 COMPLETE CBC W/AUTO DIFF WBC: CPT

## 2022-03-10 PROCEDURE — 87088 URINE BACTERIA CULTURE: CPT

## 2022-03-10 PROCEDURE — 94664 DEMO&/EVAL PT USE INHALER: CPT

## 2022-03-10 PROCEDURE — 87081 CULTURE SCREEN ONLY: CPT

## 2022-03-10 PROCEDURE — 86850 RBC ANTIBODY SCREEN: CPT

## 2022-03-10 PROCEDURE — 86901 BLOOD TYPING SEROLOGIC RH(D): CPT

## 2022-03-10 PROCEDURE — 36415 COLL VENOUS BLD VENIPUNCTURE: CPT

## 2022-03-10 PROCEDURE — 86900 BLOOD TYPING SEROLOGIC ABO: CPT

## 2022-03-10 PROCEDURE — 81000 URINALYSIS NONAUTO W/SCOPE: CPT

## 2022-03-10 RX ADMIN — DOCUSATE SODIUM SCH MG: 100 CAPSULE ORAL at 11:02

## 2022-03-10 RX ADMIN — HYDROCODONE BITARTRATE AND ACETAMINOPHEN PRN EA: 5; 325 TABLET ORAL at 23:28

## 2022-03-10 RX ADMIN — Medication SCH MLS/HR: at 09:47

## 2022-03-10 RX ADMIN — HYDROCODONE BITARTRATE AND ACETAMINOPHEN PRN EA: 5; 325 TABLET ORAL at 13:43

## 2022-03-10 RX ADMIN — KETOROLAC TROMETHAMINE SCH MG: 30 INJECTION, SOLUTION INTRAMUSCULAR; INTRAVENOUS at 08:30

## 2022-03-10 RX ADMIN — Medication SCH MLS/HR: at 13:52

## 2022-03-10 RX ADMIN — KETOROLAC TROMETHAMINE SCH MG: 30 INJECTION, SOLUTION INTRAMUSCULAR; INTRAVENOUS at 20:23

## 2022-03-10 RX ADMIN — DOCUSATE SODIUM SCH MG: 100 CAPSULE ORAL at 20:18

## 2022-03-10 RX ADMIN — HYDROCODONE BITARTRATE AND ACETAMINOPHEN PRN EA: 5; 325 TABLET ORAL at 18:48

## 2022-03-10 RX ADMIN — KETOROLAC TROMETHAMINE SCH MG: 30 INJECTION, SOLUTION INTRAMUSCULAR; INTRAVENOUS at 13:52

## 2022-03-10 RX ADMIN — Medication SCH ML: at 19:43

## 2022-03-10 NOTE — DISCHARGE INST-WOMEN'S SERVICE
Discharge Inst-Women's Serv


Depart Medication/Instructions


New, Converted or Re-Newed RX:  Transmitted to Pharmacy


Final Diagnosis


POD 2 RLTCS


Problems Reviewed?:  Yes





Consults/Follow Up


Additional Follow Up:  Yes


Orders/Referrals


Dr. Davis in 7-10 days and in 6 weeks





Activity


Activity:  Activity as Tolerated


Driving Instructions:  No Driving for 1 Week


NO SMOKING:  NO SMOKING


Nothing Inside Vagina:  No Douching, No [place], No Tampons





Diet


Discharge Diet:  No Restrictions


Symptoms to Report to :  Bleeding Excessive, Pain Increased, Fever Over 101 

Degrees F, Vaginal Bleeding Increase, Questions/Concerns


For Any Problems or Questions:  Contact Your Physician





Skin/Wound Care


Infection Signs and Symptoms:  Increased Redness, Foul Odor of Wound, Increased 

Drainage, Skin Itchy or Has a Rash, Increased Swelling, Temperature Above 101  F


Operative Area Clean and Dry:  Keep Incision Clean/Dry


Stitches/Staples/Dermabond:  Dermabond, Care of Stitches


Bathing Instructions:  BENEDICTO Mari DO            Mar 10, 2022 08:54

## 2022-03-10 NOTE — OPERATIVE REPORT
DATE OF SERVICE:  03/10/2022



PREOPERATIVE DIAGNOSES:

1.  A 37-year-old G4, P2 at 36 weeks and 3 days' gestation.

2.  Gestational hypertension.

3.  Elevated umbilical Dopplers on ultrasound.

4.  Significant drop in amniotic fluid.

5.  Bilateral renal fetal pyelectasis.

6.  Previous  section.



POSTOPERATIVE DIAGNOSES:

1.  A 37-year-old G4, P2 at 36 weeks and 3 days' gestation.

2.  Gestational hypertension.

3.  Elevated umbilical Dopplers on ultrasound.

4.  Significant drop in amniotic fluid.

5.  Bilateral renal fetal pyelectasis.

6.  Previous  section.



PROCEDURE:

Repeat low transverse  section.



SURGEON:

Mikie Johnson DO



ASSISTANT:

Helen Aparicio DNP, was necessary for manipulation and retraction throughout

the procedure.



ANESTHESIA:

Spinal.



ESTIMATED BLOOD LOSS:

500 mL.



URINE OUTPUT:

225 mL clear at the end of the procedure.



FLUIDS:

1000 mL lactated Ringer's solution.



FINDINGS:

A live female infant weighing 5 pounds 10 ounces, Apgars of 8 and 9.  Grossly

normal appearing uterus, bilateral fallopian tubes and ovaries with arcuate

shaped uterus.



SPECIMEN SENT:

Placenta.



INDICATIONS FOR PROCEDURE:

A 37-year-old female is a patient who was seen in the office yesterday and had

critical concerns with the pregnancy, which prompted my decision to proceed with

early delivery.  Risk of early delivery, prematurity were discussed with the

patient and  versus risk of ongoing pregnancy and possibility of

stillbirth and the patient experienced stillbirth in the past approximately 35

weeks.  They wished to proceed with delivery at this point.  Risks of the

procedure itself was reviewed with the patient including bleeding, infection,

damage to surrounding structures including, but not limited to bowel, bladder,

ureter, kidneys, possible need for reoperation, postoperative complications that

may occur, risk from anesthesia and even death.  After everything was discussed

with the patient in detail, consent was obtained, the patient was taken to the

operating room.



OPERATIVE REPORT IN DETAIL:

Once in the operating room, spinal analgesia was found to be adequate.  She was

placed in the supine position with leftward tilt, prepped and draped in normal

sterile fashion where a timeout was performed.  Anesthesia was tested.  I then

make a Pfannenstiel skin incision through the previous  scar using knife

and carried down to the underlying fascia using Bovie cautery.  The fascial

incision extended laterally using Bovie cautery.  Superior aspect of fascial

incision was then grasped with Kocher clamps, tented upwards and dissected off

the underlying rectus muscles.  The inferior aspect of fascial incision was then

grasped with Kocher clamps, tented up and dissected off the underlying rectus

muscles.  The underlying rectus muscles were dissected down the midline using

sharp dissection, which closed the peritoneum, which I entered bluntly and

extended using blunt traction.  Efe ring retractor was placed in the

peritoneal incision, which offers excellent lateral sidewall retraction.  I

identified the lower uterine segment, which was found to be very thinned out

with a small uterine window.  I made an incision through the vesicouterine

peritoneum and bluntly dissected off the lower uterine segment, creating a

bladder flap.  I then proceeded with my myotomy until membranes were visualized,

at which point I extended the uterine incision laterally and superiorly using

bandage scissors.  Amniotomy was performed.  In the process of doing this, clear

fluid was noted.  The infant was found in vertex presentation.  With gentle

fundal pressure, the infant's head was elevated up the incision where the nares

and oropharynx were bulb suctioned and nuchal cord was reduced x2.  Anterior and

posterior shoulders were delivered.  Infant was then brought to the operating

field with cord doubly clamped and cut and infant handed off to waiting nurses

in attendance.  Cord blood was collected, 3-vessel cord with intact placenta was

delivered spontaneously thereafter.  IV Pitocin was initiated to facilitate

uterine contraction.  Uterine fundus confirmed by manual massage.  The uterus

was then exteriorized and cleared of all endometrial clots and debris.  I then

proceeded with closing the uterine incision using 0 Vicryl suture in running

locked fashion.  Second layer of imbricating 0 Monocryl was placed.  Excellent

hemostasis was noted after doing this.  I then placed the uterus back in the

pelvis and copiously irrigated the pelvis using normal saline.  Once again, this

active bleeding noted from any of my dissection planes.  I placed Interceed

antiadhesive over my low transverse incision.  I then removed the Efe ring

retractor and proceeded with closing the peritoneum using 3-0 Vicryl suture in

interrupted fashion.  The rectus muscle reapproximated using 3-0 Vicryl suture

in interrupted fashion.  The fascia was reapproximated using 0 Vicryl suture in

running fashion.  Subcutaneous tissue was reapproximated using 3-0 plain

interrupted subcutaneous stitch and skin reapproximated using 4-0 Monocryl

running subcuticular.  Dermabond was applied to incision and sterile dressings

with adhesive white tape.  The patient tolerated the procedure well and was

taken to recovery area in stable condition.  Lap and sponge counts were correct

at the end of procedure.  Instrument counts correct as well.  Two grams of Ancef

given preoperatively for infection prophylaxis.





Job ID: [id number]

DocumentID: [id number]

Dictated Date:  03/10/2022 09:06:43

Transcription Date: 03/10/2022 13:17:14

Dictated By: MIKIE JOHNSON DO

## 2022-03-10 NOTE — HISTORY & PHYSICAL-OB
OB - Chief Complaint & HPI


Date/Time


Date of Admission:


Date of Admission:  Mar 10, 2022 at 06:04


Date seen by a Provider:  Mar 10, 2022


Time Seen by a Provider:  07:50





Chief Complaint/History


OB-Reason for Admission/Chief:   Section


Hx :  4


Hx Para:  2


Expected Date of Delivery:  2022


Gestational Age in Weeks:  36


Gestational Age in Days:  3


Admission Nurse Assessment Rev:  Yes





Allergies and Home Medications


Allergies


Coded Allergies:  


     No Known Drug Allergies (Unverified , 18)





Patient Home Medication List


Home Medication List Reviewed:  Yes


Azelaic Acid (Azelaic Acid) Unknown Strength Gel..gram., 15 % TP PRN, (Reported)


   Entered as Reported by: PIERRE METCALF on 3/9/22 134


Ferrous Sulfate (Ferosul) 325 Mg Tablet, 325 MG PO BID, (Reported)


   Entered as Reported by: PIERRE METCALF on 3/9/22 134


Levothyroxine Sodium (Levothyroxine Sodium) 200 Mcg Tablet, 200 MCG PO DAILY, 

(Reported)


   Entered as Reported by: JUDY ZAYAS on 18


Prenatal Vit/Iron Fumarate/FA (Prenatal Tablet) 1 Each Tablet, 1 EACH PO DAILY, 

(Reported)


   Entered as Reported by: JUDY ZAYAS on 18


Discontinued Medications


Docusate Sodium (Docusate Sodium) 100 Mg Capsule, 100 MG PO BID


   Discontinued Reason: No Longer Taking


   Prescribed by: KENDRA BE on 9/15/18 0746


Ibuprofen (Ibuprofen) 800 Mg Tablet, 800 MG PO Q6H


   Discontinued Reason: No Longer Taking


   Prescribed by: KENDRA BE on 9/15/18 0746


Oxycodone HCl/Acetaminophen (Percocet  mg Tablet) 1 Each Tablet, 1 TAB PO 

Q4HR PRN for PAIN-MODERATE TO SEVERE


   Discontinued Reason: No Longer Taking


   Prescribed by: KENDRA BE on 9/15/18 0746





OB - History


Hx of Present Pregnancy


Prenatal Care:  Yes


Ultrasounds:  Abnormal US findings (US yesterday showed drastic decrease in DANIEL,

and concerning umbilical dopplers.)


Obstetrical Complications:  Gestational Hypertension


Medical Complications:  None





Delivery History


Adverse Rxn to Tranfusion:  No





Patient Past Medical History





hx of stillbirth





Immunizations


Influenza Vaccine Up-to-Date:  No; Not Current


First/Initial COVID19 Vaccine:  


Second COVID19 Vaccination:  


COVID19 Vaccine :  Moderna





OB - Admission Exam


Physical Exam


Vitals:





Vital Signs








 3/10/22





 06:32


 


Temp 36.3


 


Pulse 98


 


Resp 18


 


Pulse Ox 98


 


O2 Delivery Room Air








HEENT:  NCAT


Heart:  Rhythm Normal


Lungs:  Clear


Abdomen:  Gravid


Membranes:  Intact


Fetal Heart Rate:  130's


Accelerations:  Accelerations Present


Decelerations:  No Decelerations


Short Term Variability:  Present


Long Term Variability:  Average (6-25)


Contractions on Admission:  >10 Minutes Apart


Labs





Laboratory Tests








Test


 3/10/22


06:39 Range/Units


 


 


White Blood Count


 6.5 


 4.3-11.0


10^3/uL


 


Red Blood Count


 4.36 


 3.80-5.11


10^6/uL


 


Hemoglobin 12.6  11.5-16.0  g/dL


 


Hematocrit 38  35-52  %


 


Mean Corpuscular Volume 86  80-99  fL


 


Mean Corpuscular Hemoglobin 29  25-34  pg


 


Mean Corpuscular Hemoglobin


Concent 34 


 32-36  g/dL





 


Red Cell Distribution Width 20.0 H 10.0-14.5  %


 


Platelet Count


 178 


 130-400


10^3/uL


 


Mean Platelet Volume 10.1  9.0-12.2  fL


 


Immature Granulocyte % (Auto) 0   %


 


Neutrophils (%) (Auto) 67  42-75  %


 


Lymphocytes (%) (Auto) 25  12-44  %


 


Monocytes (%) (Auto) 7  0-12  %


 


Eosinophils (%) (Auto) 1  0-10  %


 


Basophils (%) (Auto) 0  0-10  %


 


Neutrophils # (Auto)


 4.3 


 1.8-7.8


10^3/uL


 


Lymphocytes # (Auto)


 1.6 


 1.0-4.0


10^3/uL


 


Monocytes # (Auto)


 0.5 


 0.0-1.0


10^3/uL


 


Eosinophils # (Auto)


 0.1 


 0.0-0.3


10^3/uL


 


Basophils # (Auto)


 0.0 


 0.0-0.1


10^3/uL


 


Immature Granulocyte # (Auto)


 0.0 


 0.0-0.1


10^3/uL











OB - Assessment/Plan/Diagnosis


Assessment


Assessment:   section


Admission Dx


38 yo  @ 36 weeks


Hx of still birth at 36 weeks


Previous 


GHTN


Elevated umbilical doppler


Admission Status:  Inpatient Order (span 2 midnights)


Reason for Inpatient Admission:  


Repeat cesareaan





Plan


Plan:   Section











BENEDICTO JOHNSON DO            Mar 10, 2022 07:20

## 2022-03-11 VITALS — DIASTOLIC BLOOD PRESSURE: 68 MMHG | SYSTOLIC BLOOD PRESSURE: 116 MMHG

## 2022-03-11 VITALS — DIASTOLIC BLOOD PRESSURE: 75 MMHG | SYSTOLIC BLOOD PRESSURE: 125 MMHG

## 2022-03-11 VITALS — DIASTOLIC BLOOD PRESSURE: 76 MMHG | SYSTOLIC BLOOD PRESSURE: 123 MMHG

## 2022-03-11 VITALS — DIASTOLIC BLOOD PRESSURE: 61 MMHG | SYSTOLIC BLOOD PRESSURE: 109 MMHG

## 2022-03-11 LAB
BASOPHILS # BLD AUTO: 0 10^3/UL (ref 0–0.1)
BASOPHILS NFR BLD AUTO: 0 % (ref 0–10)
EOSINOPHIL # BLD AUTO: 0.1 10^3/UL (ref 0–0.3)
EOSINOPHIL NFR BLD AUTO: 1 % (ref 0–10)
HCT VFR BLD CALC: 28 % (ref 35–52)
HGB BLD-MCNC: 9.2 G/DL (ref 11.5–16)
LYMPHOCYTES # BLD AUTO: 1.1 10^3/UL (ref 1–4)
LYMPHOCYTES NFR BLD AUTO: 14 % (ref 12–44)
MANUAL DIFFERENTIAL PERFORMED BLD QL: NO
MCH RBC QN AUTO: 29 PG (ref 25–34)
MCHC RBC AUTO-ENTMCNC: 33 G/DL (ref 32–36)
MCV RBC AUTO: 89 FL (ref 80–99)
MONOCYTES # BLD AUTO: 0.4 10^3/UL (ref 0–1)
MONOCYTES NFR BLD AUTO: 6 % (ref 0–12)
NEUTROPHILS # BLD AUTO: 6.3 10^3/UL (ref 1.8–7.8)
NEUTROPHILS NFR BLD AUTO: 79 % (ref 42–75)
PLATELET # BLD: 163 10^3/UL (ref 130–400)
PMV BLD AUTO: 10.1 FL (ref 9–12.2)
WBC # BLD AUTO: 8 10^3/UL (ref 4.3–11)

## 2022-03-11 RX ADMIN — IBUPROFEN SCH MG: 600 TABLET ORAL at 16:16

## 2022-03-11 RX ADMIN — Medication SCH ML: at 00:45

## 2022-03-11 RX ADMIN — Medication SCH ML: at 02:10

## 2022-03-11 RX ADMIN — FERROUS SULFATE TAB 325 MG (65 MG ELEMENTAL FE) SCH MG: 325 (65 FE) TAB at 17:56

## 2022-03-11 RX ADMIN — KETOROLAC TROMETHAMINE SCH MG: 30 INJECTION, SOLUTION INTRAMUSCULAR; INTRAVENOUS at 02:10

## 2022-03-11 RX ADMIN — DOCUSATE SODIUM SCH MG: 100 CAPSULE ORAL at 09:51

## 2022-03-11 RX ADMIN — HYDROCODONE BITARTRATE AND ACETAMINOPHEN PRN EA: 5; 325 TABLET ORAL at 09:54

## 2022-03-11 RX ADMIN — IBUPROFEN SCH MG: 600 TABLET ORAL at 21:58

## 2022-03-11 RX ADMIN — DOCUSATE SODIUM SCH MG: 100 CAPSULE ORAL at 21:58

## 2022-03-11 RX ADMIN — HYDROCODONE BITARTRATE AND ACETAMINOPHEN PRN EA: 5; 325 TABLET ORAL at 16:17

## 2022-03-11 RX ADMIN — FERROUS SULFATE TAB 325 MG (65 MG ELEMENTAL FE) SCH MG: 325 (65 FE) TAB at 09:51

## 2022-03-11 RX ADMIN — IBUPROFEN SCH MG: 600 TABLET ORAL at 09:54

## 2022-03-11 RX ADMIN — HYDROCODONE BITARTRATE AND ACETAMINOPHEN PRN EA: 5; 325 TABLET ORAL at 04:08

## 2022-03-11 NOTE — POSTPARTUM PROGRESS NOTE
Postpartum Note


Postpartum Note


Postpartum Day # 1





Subjective:


Patient is without complaints. Ambulating, voiding. Tolerating a regular diet 

without nausea or vomiting. Normal lochia. Pain is well controlled with oral 

pain medications. 





Objective:








Physical Exam:


General - Alert and oriented, no apparent distress


Abdomen - Soft, appropriately tender to palpation, non-distended, fundus firm at

umbilicus


Extremities - no edema, negative Frederic's bilaterally 


Incision- c/d/i





Assessment:


POD 1 RLTCS


Acute blood loss anemia








Plan:


Routine postpartum care.


Encourage breast feeding.


Encourage ambulation.


Ferrous sulfate supplementation.


Plan for discharge tomorrow





Vitals - Labs


Vital Signs - I&O





Vital Signs








  Date Time  Temp Pulse Resp B/P (MAP) Pulse Ox O2 Delivery O2 Flow Rate FiO2


 


3/11/22 04:08 37.1 95 18 116/68 (84) 98 Room Air  


 


3/10/22 23:28 37.3 95 18 108/64 (79) 98 Room Air  


 


3/10/22 20:23 37.1 96 18 136/85 (102) 98 Room Air  


 


3/10/22 18:00 37.2 87 18 130/84 (99) 98 Room Air  


 


3/10/22 12:45 37.0 79 18 122/74 (90) 98 Room Air  


 


3/10/22 10:45 37.0 93 16 150/81 (104) 99 Room Air  


 


3/10/22 10:00 36.7  18 139/90 (106) 99 Room Air  


 


3/10/22 10:00      Room Air  


 


3/10/22 09:45      Room Air  


 


3/10/22 09:45 36.8  14 126/103 (111) 99 Room Air  


 


3/10/22 09:30      Room Air  


 


3/10/22 09:30 36.5  18 125/99 (108) 99 Room Air  


 


3/10/22 09:15      Room Air  


 


3/10/22 09:15 36.5  20 149/91 (110) 99 Room Air  














I & O 


 


 3/11/22





 07:00


 


Intake Total 3950 ml


 


Output Total 3075 ml


 


Balance 875 ml











Labs


Laboratory Tests


3/11/22 05:09: 


White Blood Count 8.0, Red Blood Count 3.16L, Hemoglobin 9.2#L, Hematocrit 28L, 

Mean Corpuscular Volume 89, Mean Corpuscular Hemoglobin 29, Mean Corpuscular 

Hemoglobin Concent 33, Red Cell Distribution Width 20.0H, Platelet Count 163, 

Mean Platelet Volume 10.1, Immature Granulocyte % (Auto) 0, Neutrophils (%) 

(Auto) 79H, Lymphocytes (%) (Auto) 14, Monocytes (%) (Auto) 6, Eosinophils (%) 

(Auto) 1, Basophils (%) (Auto) 0, Neutrophils # (Auto) 6.3, Lymphocytes # (Auto)

1.1, Monocytes # (Auto) 0.4, Eosinophils # (Auto) 0.1, Basophils # (Auto) 0.0, 

Immature Granulocyte # (Auto) 0.0











BENEDICTO JOHNSON DO            Mar 11, 2022 07:27

## 2022-03-11 NOTE — ANESTHESIA-REGIONAL POST-OP
Regional


Patient Condition


Mental Status:  Alert, Oriented x3


Circulation:  Same as Pre-Op


Headache:  Absent


Sensation:  Full Recovery


Motor Block:  Absent





Post Op Complications


Complications


None





Follow Up Care/Instructions


Patient Instructions


None needed.





Anesthesia/Patient Condition


Patient is doing well, no complaints, stable vital signs, no apparent adverse 

anesthesia problems.











YOU RICHMOND DO         Mar 11, 2022 10:57

## 2022-03-12 VITALS — DIASTOLIC BLOOD PRESSURE: 83 MMHG | SYSTOLIC BLOOD PRESSURE: 125 MMHG

## 2022-03-12 VITALS — DIASTOLIC BLOOD PRESSURE: 87 MMHG | SYSTOLIC BLOOD PRESSURE: 131 MMHG

## 2022-03-12 RX ADMIN — IBUPROFEN SCH MG: 600 TABLET ORAL at 10:00

## 2022-03-12 RX ADMIN — FERROUS SULFATE TAB 325 MG (65 MG ELEMENTAL FE) SCH MG: 325 (65 FE) TAB at 08:15

## 2022-03-12 RX ADMIN — DOCUSATE SODIUM SCH MG: 100 CAPSULE ORAL at 08:15

## 2022-03-12 RX ADMIN — IBUPROFEN SCH MG: 600 TABLET ORAL at 04:04

## 2022-03-12 RX ADMIN — HYDROCODONE BITARTRATE AND ACETAMINOPHEN PRN EA: 5; 325 TABLET ORAL at 04:06

## 2022-03-12 NOTE — SHORT STAY SUMMARY
Discharge Summary


Hospital Course


Was the Problem List Reviewed?:  Yes


Final Diagnosis:  Postpartum care following  section


Hospital Course


Date of Admission: Mar 10, 2022 at 06:04 


Admission Diagnosis :  





Family Physician/Provider: No,Local Physician  





Date of Discharge: 3/12/22 


Discharge Diagnosis: Postpartum care following  section 





Hospital Course:


Indy Ontiveros is a 38 yo female at 36w3d who presented for repeat low 

transverse  section due to complications related to gestational 

hypertension, fetal renal bilateral pylectasis, arcuate shaped uterus and h/o 

stillbirth in prior pregnancy.  She tolerated the procedure well with a postop 

hgb of 9.2 from 12.6, requiring iron supplementation.  Her postpartum course was

uncomplicated and by POD#2, she was meeting all postoperative milestones with 

normotensive blood pressures.  She was discharged home on POD#2 in stable 

condition with no issues with plans for close follow-up in office in 1 week for 

an incision and BP check. She will then follow-up in 6 weeks for a postpartum 

examination. 














Labs and Pending Lab Test:





Microbiology


3/10/22 Urine Culture - Final, Complete


          NO GROWTH


3/10/22 MRSA Screen - Final, Complete


          MRSA not isolated





Home Meds


Active


Ibu (Ibuprofen) 600 Mg Tablet 600 Mg PO Q6HR


HYDROcodone/APAP  5 MG/325 MG TAB (Acetaminophen/Hydrocodone Bitart) 1 Tab Tab 

1-2 Ea PO Q6HR PRN


Docusate Sodium 100 Mg Capsule 100 Mg PO BID PRN


Reported


Ferosul (Ferrous Sulfate) 325 Mg Tablet 325 Mg PO BID


Azelaic Acid Unknown Strength Gel..gram. 15 % TP PRN


Prenatal Tablet (Prenatal Vit/Iron Fumarate/FA) 1 Each Tablet 1 Each PO DAILY


Levothyroxine Sodium 200 Mcg Tablet 200 Mcg PO DAILY


Assessment/Pt Instructions


Patient is cleared for discharge home with plans for close follow-up in 1 week 

for an incision check and 6 weeks for a postpartum appointment.





Discharge Instructions


Discharge Diet:  No Restrictions





Discharge Physical Examination


Allergies:  


Coded Allergies:  


     No Known Drug Allergies (Unverified , 18)





Discharge Summary


Date of Admission


Mar 10, 2022 at 06:04


Date of Discharge





Discharge Date:  Mar 12, 2022











GREGORY VICTORIA MD              Mar 12, 2022 10:17

## 2023-01-27 ENCOUNTER — APPOINTMENT (RX ONLY)
Dept: URBAN - METROPOLITAN AREA CLINIC 76 | Facility: CLINIC | Age: 38
Setting detail: DERMATOLOGY
End: 2023-01-27

## 2023-01-27 DIAGNOSIS — L82.1 OTHER SEBORRHEIC KERATOSIS: ICD-10-CM

## 2023-01-27 DIAGNOSIS — L81.4 OTHER MELANIN HYPERPIGMENTATION: ICD-10-CM

## 2023-01-27 DIAGNOSIS — D49.2 NEOPLASM OF UNSPECIFIED BEHAVIOR OF BONE, SOFT TISSUE, AND SKIN: ICD-10-CM

## 2023-01-27 DIAGNOSIS — D22 MELANOCYTIC NEVI: ICD-10-CM

## 2023-01-27 DIAGNOSIS — Z71.89 OTHER SPECIFIED COUNSELING: ICD-10-CM

## 2023-01-27 DIAGNOSIS — D18.0 HEMANGIOMA: ICD-10-CM

## 2023-01-27 PROBLEM — D22.62 MELANOCYTIC NEVI OF LEFT UPPER LIMB, INCLUDING SHOULDER: Status: ACTIVE | Noted: 2023-01-27

## 2023-01-27 PROBLEM — D22.72 MELANOCYTIC NEVI OF LEFT LOWER LIMB, INCLUDING HIP: Status: ACTIVE | Noted: 2023-01-27

## 2023-01-27 PROBLEM — D22.61 MELANOCYTIC NEVI OF RIGHT UPPER LIMB, INCLUDING SHOULDER: Status: ACTIVE | Noted: 2023-01-27

## 2023-01-27 PROBLEM — D22.9 MELANOCYTIC NEVI, UNSPECIFIED: Status: ACTIVE | Noted: 2023-01-27

## 2023-01-27 PROBLEM — D22.22 MELANOCYTIC NEVI OF LEFT EAR AND EXTERNAL AURICULAR CANAL: Status: ACTIVE | Noted: 2023-01-27

## 2023-01-27 PROBLEM — D22.5 MELANOCYTIC NEVI OF TRUNK: Status: ACTIVE | Noted: 2023-01-27

## 2023-01-27 PROBLEM — D18.01 HEMANGIOMA OF SKIN AND SUBCUTANEOUS TISSUE: Status: ACTIVE | Noted: 2023-01-27

## 2023-01-27 PROCEDURE — ? OBSERVATION AND MEASURE

## 2023-01-27 PROCEDURE — 11103 TANGNTL BX SKIN EA SEP/ADDL: CPT

## 2023-01-27 PROCEDURE — ? COUNSELING

## 2023-01-27 PROCEDURE — ? BIOPSY BY SHAVE METHOD

## 2023-01-27 PROCEDURE — 99203 OFFICE O/P NEW LOW 30 MIN: CPT | Mod: 25

## 2023-01-27 PROCEDURE — 11102 TANGNTL BX SKIN SINGLE LES: CPT

## 2023-01-27 ASSESSMENT — LOCATION DETAILED DESCRIPTION DERM
LOCATION DETAILED: PERIUMBILICAL SKIN
LOCATION DETAILED: LEFT VENTRAL DISTAL FOREARM
LOCATION DETAILED: LEFT ANTERIOR PROXIMAL THIGH
LOCATION DETAILED: RIGHT PROXIMAL POSTERIOR UPPER ARM
LOCATION DETAILED: LEFT SUPERIOR UPPER BACK
LOCATION DETAILED: HAIR
LOCATION DETAILED: RIGHT PROXIMAL DORSAL FOREARM
LOCATION DETAILED: LEFT PROXIMAL PRETIBIAL REGION
LOCATION DETAILED: RIGHT MEDIAL UPPER BACK
LOCATION DETAILED: LEFT ELBOW
LOCATION DETAILED: LEFT SUPERIOR POSTERIOR HELIX
LOCATION DETAILED: RIGHT SUPERIOR UPPER BACK
LOCATION DETAILED: LEFT SUPRAPUBIC SKIN
LOCATION DETAILED: LEFT LATERAL ABDOMEN
LOCATION DETAILED: LEFT PROXIMAL POSTERIOR UPPER ARM
LOCATION DETAILED: RIGHT SUPERIOR MEDIAL UPPER BACK

## 2023-01-27 ASSESSMENT — LOCATION SIMPLE DESCRIPTION DERM
LOCATION SIMPLE: LEFT PRETIBIAL REGION
LOCATION SIMPLE: HAIR
LOCATION SIMPLE: RIGHT UPPER BACK
LOCATION SIMPLE: LEFT ELBOW
LOCATION SIMPLE: RIGHT POSTERIOR UPPER ARM
LOCATION SIMPLE: LEFT FOREARM
LOCATION SIMPLE: LEFT THIGH
LOCATION SIMPLE: ABDOMEN
LOCATION SIMPLE: GROIN
LOCATION SIMPLE: LEFT UPPER BACK
LOCATION SIMPLE: RIGHT FOREARM
LOCATION SIMPLE: LEFT EAR
LOCATION SIMPLE: LEFT POSTERIOR UPPER ARM

## 2023-01-27 ASSESSMENT — LOCATION ZONE DERM
LOCATION ZONE: ARM
LOCATION ZONE: LEG
LOCATION ZONE: EAR
LOCATION ZONE: SCALP
LOCATION ZONE: TRUNK

## 2023-01-27 NOTE — PROCEDURE: OBSERVATION
Detail Level: Detailed
Size Of Lesion: 2x2.5mm
Size Of Lesion: 3x3mm
Size Of Lesion: 4x3mm
Size Of Lesion: 5x4mm
Size Of Lesion: 3x2.5mm
Size Of Lesion: 3x2mm

## 2023-01-27 NOTE — PROCEDURE: BIOPSY BY SHAVE METHOD
Body Location Override (Optional - Billing Will Still Be Based On Selected Body Map Location If Applicable): left flexural radial mid forearm [id number]
Detail Level: Detailed
Depth Of Biopsy: dermis
Was A Bandage Applied: Yes
Size Of Lesion In Cm: 0
Biopsy Type: H and E
Biopsy Method: Personna blade
Anesthesia Type: 1% lidocaine with epinephrine
Anesthesia Volume In Cc: 0.5
Hemostasis: Drysol
Wound Care: Antibiotic ointment
Dressing: bandage
Destruction After The Procedure: No
Type Of Destruction Used: Curettage
Curettage Text: The wound bed was treated with curettage after the biopsy was performed.
Cryotherapy Text: The wound bed was treated with cryotherapy after the biopsy was performed.
Electrodesiccation Text: The wound bed was treated with electrodesiccation after the biopsy was performed.
Electrodesiccation And Curettage Text: The wound bed was treated with electrodesiccation and curettage after the biopsy was performed.
Silver Nitrate Text: The wound bed was treated with silver nitrate after the biopsy was performed.
Lab: [id number]
Lab Facility: [id number]
Consent: Written consent was obtained and risks were reviewed including but not limited to scarring, infection, bleeding, scabbing, incomplete removal, nerve damage and allergy to anesthesia.
Post-Care Instructions: I reviewed with the patient in detail post-care instructions. Patient is to keep the biopsy site dry overnight, and then apply bacitracin twice daily until healed. Patient may apply hydrogen peroxide soaks to remove any crusting.
Notification Instructions: Patient will be notified of biopsy results. However, patient instructed to call the office if not contacted within 2 weeks.
Billing Type: Third-Party Bill
Information: Selecting Yes will display possible errors in your note based on the variables you have selected. This validation is only offered as a suggestion for you. PLEASE NOTE THAT THE VALIDATION TEXT WILL BE REMOVED WHEN YOU FINALIZE YOUR NOTE. IF YOU WANT TO FAX A PRELIMINARY NOTE YOU WILL NEED TO TOGGLE THIS TO 'NO' IF YOU DO NOT WANT IT IN YOUR FAXED NOTE.
Body Location Override (Optional - Billing Will Still Be Based On Selected Body Map Location If Applicable): left mid abdomen 4x3.5mm

## 2024-02-26 ENCOUNTER — APPOINTMENT (RX ONLY)
Dept: URBAN - METROPOLITAN AREA CLINIC 76 | Facility: CLINIC | Age: 39
Setting detail: DERMATOLOGY
End: 2024-02-26

## 2024-02-26 VITALS — SYSTOLIC BLOOD PRESSURE: 130 MMHG | DIASTOLIC BLOOD PRESSURE: 84 MMHG

## 2024-02-26 DIAGNOSIS — L81.4 OTHER MELANIN HYPERPIGMENTATION: ICD-10-CM

## 2024-02-26 DIAGNOSIS — D18.0 HEMANGIOMA: ICD-10-CM

## 2024-02-26 DIAGNOSIS — D22 MELANOCYTIC NEVI: ICD-10-CM

## 2024-02-26 DIAGNOSIS — D49.2 NEOPLASM OF UNSPECIFIED BEHAVIOR OF BONE, SOFT TISSUE, AND SKIN: ICD-10-CM

## 2024-02-26 DIAGNOSIS — L82.1 OTHER SEBORRHEIC KERATOSIS: ICD-10-CM

## 2024-02-26 DIAGNOSIS — Z71.89 OTHER SPECIFIED COUNSELING: ICD-10-CM

## 2024-02-26 PROBLEM — D22.62 MELANOCYTIC NEVI OF LEFT UPPER LIMB, INCLUDING SHOULDER: Status: ACTIVE | Noted: 2024-02-26

## 2024-02-26 PROBLEM — D22.72 MELANOCYTIC NEVI OF LEFT LOWER LIMB, INCLUDING HIP: Status: ACTIVE | Noted: 2024-02-26

## 2024-02-26 PROBLEM — D22.22 MELANOCYTIC NEVI OF LEFT EAR AND EXTERNAL AURICULAR CANAL: Status: ACTIVE | Noted: 2024-02-26

## 2024-02-26 PROBLEM — D22.61 MELANOCYTIC NEVI OF RIGHT UPPER LIMB, INCLUDING SHOULDER: Status: ACTIVE | Noted: 2024-02-26

## 2024-02-26 PROBLEM — D18.01 HEMANGIOMA OF SKIN AND SUBCUTANEOUS TISSUE: Status: ACTIVE | Noted: 2024-02-26

## 2024-02-26 PROBLEM — D22.5 MELANOCYTIC NEVI OF TRUNK: Status: ACTIVE | Noted: 2024-02-26

## 2024-02-26 PROBLEM — D22.9 MELANOCYTIC NEVI, UNSPECIFIED: Status: ACTIVE | Noted: 2024-02-26

## 2024-02-26 PROCEDURE — 11103 TANGNTL BX SKIN EA SEP/ADDL: CPT

## 2024-02-26 PROCEDURE — ? COUNSELING

## 2024-02-26 PROCEDURE — ? BIOPSY BY SHAVE METHOD

## 2024-02-26 PROCEDURE — ? OBSERVATION AND MEASURE

## 2024-02-26 PROCEDURE — 99213 OFFICE O/P EST LOW 20 MIN: CPT | Mod: 25

## 2024-02-26 PROCEDURE — 11102 TANGNTL BX SKIN SINGLE LES: CPT

## 2024-02-26 ASSESSMENT — LOCATION SIMPLE DESCRIPTION DERM
LOCATION SIMPLE: LEFT PRETIBIAL REGION
LOCATION SIMPLE: RIGHT POSTERIOR UPPER ARM
LOCATION SIMPLE: RIGHT LOWER BACK
LOCATION SIMPLE: RIGHT FOREARM
LOCATION SIMPLE: LEFT EAR
LOCATION SIMPLE: LEFT POSTERIOR UPPER ARM
LOCATION SIMPLE: LEFT UPPER BACK
LOCATION SIMPLE: GROIN
LOCATION SIMPLE: LEFT ELBOW
LOCATION SIMPLE: RIGHT SHOULDER
LOCATION SIMPLE: LEFT THIGH
LOCATION SIMPLE: RIGHT UPPER BACK
LOCATION SIMPLE: HAIR
LOCATION SIMPLE: ABDOMEN

## 2024-02-26 ASSESSMENT — LOCATION ZONE DERM
LOCATION ZONE: EAR
LOCATION ZONE: LEG
LOCATION ZONE: SCALP
LOCATION ZONE: ARM
LOCATION ZONE: TRUNK

## 2024-02-26 ASSESSMENT — LOCATION DETAILED DESCRIPTION DERM
LOCATION DETAILED: RIGHT SUPERIOR MEDIAL LOWER BACK
LOCATION DETAILED: LEFT LATERAL ABDOMEN
LOCATION DETAILED: LEFT PROXIMAL POSTERIOR UPPER ARM
LOCATION DETAILED: LEFT ELBOW
LOCATION DETAILED: RIGHT MEDIAL UPPER BACK
LOCATION DETAILED: RIGHT POSTERIOR SHOULDER
LOCATION DETAILED: SUBXIPHOID
LOCATION DETAILED: RIGHT PROXIMAL DORSAL FOREARM
LOCATION DETAILED: LEFT ANTERIOR PROXIMAL THIGH
LOCATION DETAILED: RIGHT PROXIMAL POSTERIOR UPPER ARM
LOCATION DETAILED: LEFT SUPERIOR UPPER BACK
LOCATION DETAILED: LEFT PROXIMAL PRETIBIAL REGION
LOCATION DETAILED: HAIR
LOCATION DETAILED: RIGHT SUPERIOR UPPER BACK
LOCATION DETAILED: LEFT SUPRAPUBIC SKIN
LOCATION DETAILED: RIGHT SUPERIOR MEDIAL UPPER BACK
LOCATION DETAILED: LEFT SUPERIOR POSTERIOR HELIX

## 2024-02-26 NOTE — PROCEDURE: OBSERVATION
Detail Level: Detailed
Size Of Lesion: 2x2.5mm
Size Of Lesion: 3x3mm
Size Of Lesion: 4x3mm
Size Of Lesion: 5x4mm
Size Of Lesion: 3x2.5mm
Size Of Lesion: 3x2mm
Size Of Lesion: 3.5x2.5mm
Size Of Lesion: 4x4mm

## 2024-02-26 NOTE — PROCEDURE: BIOPSY BY SHAVE METHOD
Body Location Override (Optional - Billing Will Still Be Based On Selected Body Map Location If Applicable): left medial proximal thigh 6x4mm
Detail Level: Detailed
Depth Of Biopsy: dermis
Was A Bandage Applied: Yes
Size Of Lesion In Cm: 0
Biopsy Type: H and E
Biopsy Method: Personna blade
Anesthesia Type: 1% lidocaine with epinephrine
Anesthesia Volume In Cc: 0.5
Hemostasis: Drysol
Wound Care: Antibiotic ointment
Dressing: bandage
Destruction After The Procedure: No
Type Of Destruction Used: Curettage
Curettage Text: The wound bed was treated with curettage after the biopsy was performed.
Cryotherapy Text: The wound bed was treated with cryotherapy after the biopsy was performed.
Electrodesiccation Text: The wound bed was treated with electrodesiccation after the biopsy was performed.
Electrodesiccation And Curettage Text: The wound bed was treated with electrodesiccation and curettage after the biopsy was performed.
Silver Nitrate Text: The wound bed was treated with silver nitrate after the biopsy was performed.
Lab: [id number]
Lab Facility: [id number]
Consent: Written consent was obtained and risks were reviewed including but not limited to scarring, infection, bleeding, scabbing, incomplete removal, nerve damage and allergy to anesthesia.
Post-Care Instructions: I reviewed with the patient in detail post-care instructions. Patient is to keep the biopsy site dry overnight, and then apply bacitracin twice daily until healed. Patient may apply hydrogen peroxide soaks to remove any crusting.
Notification Instructions: Patient will be notified of biopsy results. However, patient instructed to call the office if not contacted within 2 weeks.
Billing Type: Third-Party Bill
Information: Selecting Yes will display possible errors in your note based on the variables you have selected. This validation is only offered as a suggestion for you. PLEASE NOTE THAT THE VALIDATION TEXT WILL BE REMOVED WHEN YOU FINALIZE YOUR NOTE. IF YOU WANT TO FAX A PRELIMINARY NOTE YOU WILL NEED TO TOGGLE THIS TO 'NO' IF YOU DO NOT WANT IT IN YOUR FAXED NOTE.
Body Location Override (Optional - Billing Will Still Be Based On Selected Body Map Location If Applicable): left sacral back 1x1.5mm
Body Location Override (Optional - Billing Will Still Be Based On Selected Body Map Location If Applicable): right medial mid scapular back 5x4
Body Location Override (Optional - Billing Will Still Be Based On Selected Body Map Location If Applicable): right mid scapular back 5x4.5mm

## 2025-07-10 ENCOUNTER — APPOINTMENT (OUTPATIENT)
Dept: URBAN - METROPOLITAN AREA CLINIC 76 | Facility: CLINIC | Age: 40
Setting detail: DERMATOLOGY
End: 2025-07-10

## 2025-07-10 DIAGNOSIS — L82.1 OTHER SEBORRHEIC KERATOSIS: ICD-10-CM

## 2025-07-10 DIAGNOSIS — L81.4 OTHER MELANIN HYPERPIGMENTATION: ICD-10-CM

## 2025-07-10 DIAGNOSIS — D18.0 HEMANGIOMA: ICD-10-CM

## 2025-07-10 DIAGNOSIS — Z71.89 OTHER SPECIFIED COUNSELING: ICD-10-CM

## 2025-07-10 DIAGNOSIS — D22 MELANOCYTIC NEVI: ICD-10-CM

## 2025-07-10 PROBLEM — D22.72 MELANOCYTIC NEVI OF LEFT LOWER LIMB, INCLUDING HIP: Status: ACTIVE | Noted: 2025-07-10

## 2025-07-10 PROBLEM — D22.61 MELANOCYTIC NEVI OF RIGHT UPPER LIMB, INCLUDING SHOULDER: Status: ACTIVE | Noted: 2025-07-10

## 2025-07-10 PROBLEM — D22.9 MELANOCYTIC NEVI, UNSPECIFIED: Status: ACTIVE | Noted: 2025-07-10

## 2025-07-10 PROBLEM — D18.01 HEMANGIOMA OF SKIN AND SUBCUTANEOUS TISSUE: Status: ACTIVE | Noted: 2025-07-10

## 2025-07-10 PROBLEM — D22.22 MELANOCYTIC NEVI OF LEFT EAR AND EXTERNAL AURICULAR CANAL: Status: ACTIVE | Noted: 2025-07-10

## 2025-07-10 PROBLEM — D22.5 MELANOCYTIC NEVI OF TRUNK: Status: ACTIVE | Noted: 2025-07-10

## 2025-07-10 PROBLEM — D22.62 MELANOCYTIC NEVI OF LEFT UPPER LIMB, INCLUDING SHOULDER: Status: ACTIVE | Noted: 2025-07-10

## 2025-07-10 PROCEDURE — ? OBSERVATION

## 2025-07-10 PROCEDURE — ? COUNSELING

## 2025-07-10 ASSESSMENT — LOCATION SIMPLE DESCRIPTION DERM
LOCATION SIMPLE: LEFT PRETIBIAL REGION
LOCATION SIMPLE: GROIN
LOCATION SIMPLE: RIGHT UPPER BACK
LOCATION SIMPLE: LEFT ELBOW
LOCATION SIMPLE: LEFT UPPER BACK
LOCATION SIMPLE: RIGHT POSTERIOR UPPER ARM
LOCATION SIMPLE: LEFT EAR
LOCATION SIMPLE: HAIR
LOCATION SIMPLE: RIGHT SHOULDER
LOCATION SIMPLE: LEFT CALF
LOCATION SIMPLE: RIGHT BUTTOCK
LOCATION SIMPLE: ABDOMEN
LOCATION SIMPLE: LEFT POSTERIOR UPPER ARM
LOCATION SIMPLE: RIGHT FOREARM
LOCATION SIMPLE: LEFT THIGH

## 2025-07-10 ASSESSMENT — LOCATION DETAILED DESCRIPTION DERM
LOCATION DETAILED: SUBXIPHOID
LOCATION DETAILED: LEFT ELBOW
LOCATION DETAILED: RIGHT POSTERIOR SHOULDER
LOCATION DETAILED: RIGHT SUPERIOR UPPER BACK
LOCATION DETAILED: HAIR
LOCATION DETAILED: LEFT PROXIMAL PRETIBIAL REGION
LOCATION DETAILED: LEFT SUPERIOR UPPER BACK
LOCATION DETAILED: RIGHT PROXIMAL POSTERIOR UPPER ARM
LOCATION DETAILED: RIGHT BUTTOCK
LOCATION DETAILED: LEFT PROXIMAL POSTERIOR UPPER ARM
LOCATION DETAILED: RIGHT PROXIMAL DORSAL FOREARM
LOCATION DETAILED: LEFT DISTAL CALF
LOCATION DETAILED: LEFT ANTERIOR PROXIMAL THIGH
LOCATION DETAILED: LEFT SUPRAPUBIC SKIN
LOCATION DETAILED: RIGHT MEDIAL UPPER BACK
LOCATION DETAILED: LEFT SUPERIOR POSTERIOR HELIX
LOCATION DETAILED: LEFT LATERAL ABDOMEN

## 2025-07-10 ASSESSMENT — LOCATION ZONE DERM
LOCATION ZONE: LEG
LOCATION ZONE: ARM
LOCATION ZONE: TRUNK
LOCATION ZONE: EAR
LOCATION ZONE: SCALP